# Patient Record
Sex: MALE | Race: BLACK OR AFRICAN AMERICAN | NOT HISPANIC OR LATINO | Employment: UNEMPLOYED | URBAN - METROPOLITAN AREA
[De-identification: names, ages, dates, MRNs, and addresses within clinical notes are randomized per-mention and may not be internally consistent; named-entity substitution may affect disease eponyms.]

---

## 2017-08-18 ENCOUNTER — TELEPHONE (OUTPATIENT)
Dept: NEUROSURGERY | Facility: CLINIC | Age: 57
End: 2017-08-18

## 2017-08-18 NOTE — TELEPHONE ENCOUNTER
Upon doing chart prep, pt was referred to the [facility] for pain management on last OV 10/19/16. [facility], rec'd OV from 2011 and 2013, but no current visits.  Attempted to contact patient to see if he went for pain management, attempted to call the [facility] to see if any appt had been made/cancelled but their office was closed.      Will attempt to call again Monday, if pt calls back, please just annotate if pt went to pain management.

## 2017-08-22 NOTE — TELEPHONE ENCOUNTER
If patient is not responding to calls and did not complete recommended treatment plan, no need for him to remain on the schedule.

## 2017-08-22 NOTE — TELEPHONE ENCOUNTER
Attempted to call pt, LVM to call office back.  Spoke with the [facility] who states that he was scheduled for an OV 10/24/16 but pt cancelled that appt and did not reschedule.